# Patient Record
Sex: FEMALE | Race: WHITE | Employment: UNEMPLOYED
[De-identification: names, ages, dates, MRNs, and addresses within clinical notes are randomized per-mention and may not be internally consistent; named-entity substitution may affect disease eponyms.]

---

## 2024-03-28 ENCOUNTER — TRANSCRIBE ORDERS (OUTPATIENT)
Dept: OTHER | Age: 34
End: 2024-03-28

## 2024-03-28 ENCOUNTER — TELEPHONE (OUTPATIENT)
Dept: OTHER | Facility: CLINIC | Age: 34
End: 2024-03-28
Payer: MEDICAID

## 2024-03-28 DIAGNOSIS — I73.9 PERIPHERAL VASCULAR DISEASE (H): Primary | ICD-10-CM

## 2024-03-28 NOTE — TELEPHONE ENCOUNTER
"Referral received via Freeze Tag on 3/28/24.    Pt referred to [facility] by Lejeune, Stacey, MD  for PVD, thromboangiitis obliterans?, vasculitis?   \" Severe tibial and small-vessel disease in this young female. She does have significant smoking history and has undergone multiple medical workup. We will have her case further evaluated by vascular medicine specialists and we will need to consider DEJON, anticoagulation and or steroids. \"  Please see all notes and imaging in Care everywhere from [facility], [facility].     Routing to scheduling to coordinate the following:    NEW VASCULAR PATIENT consult with Dr. Gonzalez per referral.  Please schedule this at next available    Appt note:  Pt referred to [facility] by Lejeune, Stacey, MD  for PVD, thromboangiitis obliterans?,   vasculitis? \" Severe tibial and small-vessel disease in this young female. She does have significant smoking history and has undergone multiple medical workup. We will have her case further evaluated by vascular medicine specialists and we will need to consider DEJON, anticoagulation and or steroids.\" Please see all notes and imaging in Care everywhere from [facility], [facility].     Christa NUNEZ, RN    [facility]  [facility] Center  Office: [phone]  Fax: [phone]        "

## 2024-04-17 ENCOUNTER — OFFICE VISIT (OUTPATIENT)
Dept: OTHER | Facility: CLINIC | Age: 34
End: 2024-04-17
Attending: SURGERY
Payer: MEDICAID

## 2024-04-17 VITALS
BODY MASS INDEX: 33.32 KG/M2 | OXYGEN SATURATION: 100 % | SYSTOLIC BLOOD PRESSURE: 130 MMHG | HEART RATE: 82 BPM | HEIGHT: 64 IN | DIASTOLIC BLOOD PRESSURE: 86 MMHG | WEIGHT: 195.2 LBS

## 2024-04-17 DIAGNOSIS — L97.501 SKIN ULCER OF TOE, LIMITED TO BREAKDOWN OF SKIN, UNSPECIFIED LATERALITY (H): Primary | ICD-10-CM

## 2024-04-17 DIAGNOSIS — I73.9 PERIPHERAL VASCULAR DISEASE (H): ICD-10-CM

## 2024-04-17 PROCEDURE — 99205 OFFICE O/P NEW HI 60 MIN: CPT | Performed by: INTERNAL MEDICINE

## 2024-04-17 PROCEDURE — G0463 HOSPITAL OUTPT CLINIC VISIT: HCPCS | Performed by: INTERNAL MEDICINE

## 2024-04-17 PROCEDURE — 99417 PROLNG OP E/M EACH 15 MIN: CPT | Performed by: INTERNAL MEDICINE

## 2024-04-17 PROCEDURE — G2211 COMPLEX E/M VISIT ADD ON: HCPCS | Performed by: INTERNAL MEDICINE

## 2024-04-17 RX ORDER — ROPINIROLE 0.25 MG/1
0.25 TABLET, FILM COATED ORAL 3 TIMES DAILY
COMMUNITY

## 2024-04-17 RX ORDER — GABAPENTIN 300 MG/1
1 CAPSULE ORAL
COMMUNITY
Start: 2023-10-30 | End: 2024-04-27

## 2024-04-17 RX ORDER — PANTOPRAZOLE SODIUM 20 MG/1
20 TABLET, DELAYED RELEASE ORAL DAILY
COMMUNITY

## 2024-04-17 RX ORDER — ISOSORBIDE MONONITRATE 30 MG/1
30 TABLET, EXTENDED RELEASE ORAL DAILY
Qty: 30 TABLET | Refills: 11 | Status: SHIPPED | OUTPATIENT
Start: 2024-04-17

## 2024-04-17 RX ORDER — CYCLOBENZAPRINE HCL 10 MG
10 TABLET ORAL 3 TIMES DAILY PRN
COMMUNITY

## 2024-04-17 RX ORDER — NAPROXEN 250 MG/1
250 TABLET ORAL 2 TIMES DAILY WITH MEALS
COMMUNITY

## 2024-04-17 RX ORDER — COPPER 313.4 MG/1
1 INTRAUTERINE DEVICE INTRAUTERINE
COMMUNITY
Start: 2018-12-10 | End: 2028-12-07

## 2024-04-17 RX ORDER — VITAMIN B COMPLEX
1 TABLET ORAL DAILY
COMMUNITY

## 2024-04-17 RX ORDER — CLOPIDOGREL BISULFATE 75 MG/1
75 TABLET ORAL DAILY
Qty: 30 TABLET | Refills: 11 | Status: SHIPPED | OUTPATIENT
Start: 2024-04-17

## 2024-04-17 RX ORDER — ASPIRIN 81 MG/1
TABLET, CHEWABLE ORAL
COMMUNITY
Start: 2024-02-08

## 2024-04-17 NOTE — PROGRESS NOTES
"Patient is here to discuss consult    /84 (BP Location: Right arm, Patient Position: Chair, Cuff Size: Adult Large)   Pulse 80   Ht 5' 4\" (1.626 m)   Wt 195 lb 3.2 oz (88.5 kg)   SpO2 100%   BMI 33.51 kg/m      Questions patient would like addressed today are: N/A.    Refills are needed: No    Has homecare services and agency name:  [facility]    "

## 2024-04-17 NOTE — PROGRESS NOTES
INITIAL VASCULAR MEDICAL ASSESSMENT  REFERRAL SOURCE: Dr. 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 (H24)    Comment: This is a small vessel occlusive arterial disease. I doubt an autoimmune basis given persistently normal inflammatory markers and a normal JULIANN, ANCA, and RF. This is not atheroembolic given aortic findings. She should have a DEJON to rule out cardioembolic phenomena. This should be with a bubble study if one was not previously done. 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 It was reiterated to her that absolute tobacco cessation is a mandate. I would check labs as referenced below. I would treat her polypharmaceutically with varying types of vasodilators, AP therapy, and even therapeutic AC if she does not improve further. From a vasodilatory perspective, I would add a long acting nitrate to start. I will see her in f/u in one month. We could add NitroBID ointment at that time if not improving. We could also in the future withdraw the LA nitrate and treat her with Viagra. I would feel comfortable using Viagra with topical NitroBID but not systemic Imdur. I would also add P2Y12 inhibition to ASA. If when seen in f/u she is not improving further we could even add therapeutic AC. I have treated multiple similar patients in the past with a regimen of this sort and achieved total wound healing.   Plan: clopidogrel (PLAVIX) 75 MG tablet, isosorbide         mononitrate (IMDUR) 30 MG 24 hr tablet        Check YUSRA with TBIs and TCO2 in [place], [place] if not already done.       The longitudinal care of plan for Soledad was addressed during this visit. Due to added complexity of care, we will continue to support Soledad and the subsequent management of this condition and with ongoing continuity of care for this condition.       103 minutes total care on  today's date due to extensive record review from OSH.       HPI: Soledad Rush is a 33 year old female with a h/o rheumatic fever on monthly PCN injections at present.  She also has recent nonhealing wounds on her right great toe which are now slowly improving. She had been smoking cigarettes regularly for many years, but has now thankfully stopped with modest improvement in the wound. She was admitted to the [facility] in [place], [place] this past February and had a series of labs and imaging undertaken. Multiple HC markers (Leiden, Protein C, Protein S, APLAs) were all normal. Multiple autoimmune markers (JULIANN, RF, complement levels) were normal. CRP and ESR were mostly normal, with a few modestly abnormal results intermittently noted.  A 72 hour Holter monitor revealed only sinus rhythm. No AF or other ectopy was seen. TTE was normal w/o cardioembolic focus noted. CTA C/A/P w/ runoff revealed no atheroembolic foci. There were no PAUs or mobile arch ahteromae. She then proceeded to have a catheter directed BLE angio undertaken. This revealed severe tibial and small vessel disease bilaterally, right worse than left. There was no inline flow to the right foot, with severe occlusive and small vessel disease noted. In the LLE the anterior tibial artery was the primary runoff, proximal peroneal artery had flow but with distal occlusion. The left dorsalis pedis abruptly occluded with small collaterals filling the foot. She has used local wound care , tobacco cessation, and calcium channel blockers. The wound is still present. She does not claudicate, nor does she have rest pain.    There has been uncertainty as to the definitve diagnosis , with concern for vasculitis being present despite robust inflammatory index elevations or JULIANN / RF positivity. She had no corkscrew collaterals pathognomonic for thromboangiitis obliterans.     Review Of Systems  Skin: as above  Eyes: negative  Ears/Nose/Throat:  negative  Respiratory: No shortness of breath, dyspnea on exertion, cough, or hemoptysis  Cardiovascular: as above  Gastrointestinal: negative  Genitourinary: negative  Musculoskeletal: as above  Neurologic: negative  Psychiatric: negative  Hematologic/Lymphatic/Immunologic: negative  Endocrine: negative      PAST MEDICAL HISTORY:                No past medical history on file.    PAST SURGICAL HISTORY:                No past surgical history on file.    CURRENT MEDICATIONS:                  No current outpatient medications on file.       ALLERGIES:                Not on File    SOCIAL HISTORY:                  Social History     Socioeconomic History    Marital status:      Spouse name: Not on file    Number of children: Not on file    Years of education: Not on file    Highest education level: Not on file   Occupational History    Not on file   Tobacco Use    Smoking status: Not on file    Smokeless tobacco: Not on file   Substance and Sexual Activity    Alcohol use: Not on file    Drug use: Not on file    Sexual activity: Not on file   Other Topics Concern    Not on file   Social History Narrative    Not on file     Social Determinants of Health     Financial Resource Strain: Not on file   Food Insecurity: Not on file   Transportation Needs: Not on file   Physical Activity: Not on file   Stress: No Stress Concern Present (10/30/2023)    Received from [facility] - Occupational Stress Questionnaire     Feeling of Stress : Not at all   Social Connections: Not on file   Interpersonal Safety: Not on file   Housing Stability: Not on file       FAMILY HISTORY:                 No family history on file.      Physical exam Reveals:    O/P: WNL  HEENT: WNL  NECK: No JVD, thyromegaly, or lymphadenopathy  HEART: RRR, no murmurs, gallops, or rubs  LUNGS: CTA bilaterally without rales, wheezes, or rhonchi  GI: NABS, nondistended, nontender, soft  EXT:without cyanosis, clubbing,  or edema; right great toe with healing ulceration  NEURO: nonfocal  : no flank tenderness      Rt femoral: 3 plus palpable  Rt popliteal: 3 plus palpable  Rt DP:  nondopplerable   Rt PT:   nondopplerable       Lt femoral: 3 plus palpable   Lt popliteal: 3 plus palpable    Lt DP:  nondopplerable   Lt PT:   triphasic dopplerable       LABS:    4/16/2024 ESR: 10   (WNL)  4/16/2024 CRP: 0.49   (WNL)      4/12/2024 ESR: 16   (WNL)  4/12/2024 CRP: 0.60   (WNL)      4/09/2024 ESR: 12   (WNL)  4/09/2024 CRP: 0.43   (WNL)      4/05/2024 ESR: 7   (WNL)  4/05/2024 CRP: 0.52   (WNL)      4/02/2024 ESR: 11   (WNL)  4/16/2024 CRP: <0.30  (WNL)      03/29/2024 ESR: 13   (WNL)  03/29/2024 CRP: 0.48  (WNL)      03/26/2024 ESR: 12   (WNL)  03/26/2024 CRP: <0.30  (WNL)      03/22/2024 ESR: 18   (WNL)  03/22/2024 CRP: <0.30  (WNL)  03/26/2024 Coxiella: Neg  (WNL)      03/19/2024 Hep C: NR  (WNL)  03/19/2024 Hep B S Ab: POS (vaccinated)  03/19/2024 Hep B S Ag: NR  (WNL)  03/19/2024 Hep B Core Ab: NR (WNL)  03/19/2024 HLA-B27: Neg  (WNL)  03/19/2024 IgG 1-4: Normal  (WNL)  03/19/2024 Leiden mutation: Neg (WNL)  03/19/2024 Cryoglobulin: Neg (WNL)  03/19/2024 C3: Neg   (WNL)  03/19/2024 C4: Neg   (WNL)  03/19/2024 Protein S: Normal (WNL)  03/19/2024 Protein C: Normal (WNL)      03/18/2024 ESR: 18   (WNL)  03/18/2024 CRP: 0.36  (WNL)      03/15/2024 ESR: 16   (WNL)  03/15/2024 CRP: 0.64  (WNL)      03/11/2024 CRP: 2.25   (ABNL)      03/08/2024 ESR: 24   (ABNL)  03/08/2024 CRP: 1.65  (ABNL)      03/05/2024 CRP: 0.64  (WNL)      03/01/2024 ESR: 0.51  (WNL)    02/28/2024 LDL-C:   123  (ABNL, goal < 55)  02/28/2024 LDL-P: 1265  (ABNL)  02/28/2024 CRP: 0.64  (WNL)  02/28/2024 JULIANN: <80   (WNL)  02/28/2024 RF: <10   (WNL)    02/27/2024 CRP: 1.07  (ABNL)    02/23/2024 CRP: 0.41  (WNL)  02/23/2024 Coxiella: Neg  (WNL)  02/23/2024 Coxiella IgM: 1:16 (ABNL)    02/20/2024 CRP: 0.50  (WNL)  02/20/2024 ESR: 12   (WNL)    02/08/2024 ASO:  Positive  (ABNL)  2024 CRP: 1.39  (ABNL)  2024 ESR: 28   (ABNL)    2024 ANCA: < 1:20  (WNL)   2024 ACLA:  < 2.0  (WNL)   2024 B2GP:  < 1.1  (WNL)   2024 LAC: Undetected  (WNL)   2024 EBV: Undetected  (WNL)  2024 CCP: 6   (WNL)      2024 Coxiella: Neg  (WNL)  2024 Coxiella IgM: 1:16 (ABNL)    2024 Ace: 34   (WNL)  2024 Bartonella Ab IgG: <1:64 (WNL)  2024 HIV: Nonreactive  (WNL)  2024 Syphilis: Nonreactive (WNL)  2024 Monospot: Nonreactive (WNL)  2024 RF: <10   (WNL)  2024 CK: 44   (WNL)  2024 JULIANN: <80   (WNL)  2024 LDH: 146   (WNL)  2024 Brucella Ab: <1:20 (WNL)  2024 CMV Ab: Negative (WNL)      IMAGIN/15/2024 EKG:      Normal sinus rhythm   Normal ECG     2024 catheter directed BLE angiogram:    FINDINGS  Severe tibial and small vessel disease bilaterally, right worse than left  Right - no inline flow to foot, severe occlusive and small vessel disease  Left - anterior tibial artery as primary runoff, proximal peroneal artery flow with distal occlusion. Dorsalis pedis abruptly occludes with small collaterals filling the foot.        2024 TTE:       No sign of acute valvular disease. No sign of mitral stenosis and only trace insufficiency. Normal ejection fraction.          2024 CTA C/A/P w/ runoff    IMPRESSION:   1. Thoracic CTA negative for aneurysm or atherosclerotic irregularity. No stenosis of the great vessels.   2. CTA negative for mesenteric or renal arterial stenosis. No vessel irregularity or vascular wall thickening seen. CTA negative for iliac, femoral, or popliteal stenosis. No vessel irregularity or arterial wall thickening.   3. The trifurcation vessels are markedly small caliber, with predominantly anterior tibial runoff to the feet bilaterally. There is either occlusion or congenital absence of the posterior tibial arteries bilaterally. The peroneal  arteries are very small caliber, and the left peroneal artery appears to terminate in the mid calf.           01/23/2024 BLE arterial duplex:      IMPRESSION:   1. Right YUSRA 0.37.   2.  Left YUSRA unable to be calculated.   3.  Lower extremity arterial duplex with velocities as above. The right dorsalis pedis artery not seen and the left peroneal and dorsalis pedis arteries not seen.   4.  Consider CTA runoff or formal angiogram if indicated.         01/11/2024 72 hour Holter monitor:    CONCLUSIONS:   Sinus rhythm with an average heart rate of 88 beats per minute with no significant ectopy or arrhythmias. Normal Holter monitor.                   Elvin Blanco M.D. - 01/11/2024  4:41 PM CST   Formatting of this note might be different from the original.   ACCESSION ORDER NUMBER:  [id number]   EXAM:  [id number] : EXTENDED HOLTER 48HRS  7 DAYS    EXAM DATE:  01/04/2024   ORDERING PHYSICIAN:  Nallely Brown     PROCEDURE:   Holter monitor.     INDICATION FOR MONITORING:   Syncope and collapse.     DATES OF THE MONITOR:   January 4 to January 6, 2024   Prescribed time: 3 days   Diagnostic time: 2 days 0 hours, 14 minutes   Artifact time: 0 hours and 34 minutes     FINDINGS:   Predominant rhythm was sinus with a minimum heart rate of 48 beats per minute, average heart rate 88 beats per minute, maximum heart rate 158 beats per minute.  Tachycardia was 15% of total.  Bradycardia was less than 1% of total.  There was no significant arrhythmias and no significant ectopy. There were 0 patient triggers.     CONCLUSIONS:   Sinus rhythm with an average heart rate of 88 beats per minute with no significant ectopy or arrhythmias. Normal Holter monitor.       Elvin Blanco MD/Cyndi Silveira II, M.D. - 01/23/2024  Formatting of this note might be different from the original.  EXAM: VAS LOWER ART DUPLEX YUSRA MAYCOL       ACCESSION #: [id number]  EXAM DATE: 01/23/2024 13:01    [facility]  ORDERING  PHYSICIAN: ANDIE BONILLA     MRN #: [id number]  PATIENT NAME: GLEN MCCLELLAND    PROCEDURE: VAS LOWER ART DUPLEX YUSRA MAYCOL    TECHNIQUE: Bilateral lower extremity ABIs and bilateral arterial duplex of the lower extremity including grayscale color, spectral and Doppler    DATE:  1/23/2024 13:01 CST    COMPARISON: None.    INDICATION: RAYNAUD DISEASE    FINDINGS:  Unable to obtain recordings in the right toe, right dorsalis pedis, left dorsalis pedis and left posterior tibial arteries.  Right brachial pressure 126 mmHg.  Left brachial pressure 120 mmHg.  Right posterior tibial pressure with a blunted waveform and pressure of 47 mmHg.  Right YUSRA 0.37.  Left digital pressure 69 with a left toe-brachial index of 0.54.    Right lower extremity duplex evaluation with velocities in meters per second:  Common femoral artery 1.6.  Profunda 0.8.  Superficial femoral artery proximal, mid and distal at 0.93, 0.85 and 0.79.  Proximal and distal popliteal artery at 0.47 and 0.63.  Posterior tibial 0.5.  Peroneal 0.3.  Anterior tibial 0.4.  Dorsalis pedis not seen.

## 2024-05-19 ENCOUNTER — HEALTH MAINTENANCE LETTER (OUTPATIENT)
Age: 34
End: 2024-05-19

## 2024-05-28 ENCOUNTER — TELEPHONE (OUTPATIENT)
Dept: OTHER | Facility: CLINIC | Age: 34
End: 2024-05-28
Payer: MEDICAID

## 2024-05-29 NOTE — TELEPHONE ENCOUNTER
Called patient to schedule. She is still trying to schedule a DEJON and will call [facility] when she has that appt scheduled.    Flagging to follow up 6/12/24

## 2024-06-04 NOTE — TELEPHONE ENCOUNTER
Patient is still trying to get her DEJON scheduled at [place]. Pt stated that she will in fact call us to schedule her Virtual follow up with Dr Gonzalez when she gets her DEJON scheduled. Advised patient that we will not call her again and just wait to hear back from her.

## 2024-06-28 NOTE — TELEPHONE ENCOUNTER
Patient had her DEJON done 06/26/24. Pt is just starting her isosorbide mononitrate (IMDUR) 30 MG 24 hr tablet today (06/28/24) and her Virtual Visit is scheduled for 07/29/24 with Dr Gonzalez.

## 2024-07-01 ENCOUNTER — TELEPHONE (OUTPATIENT)
Dept: OTHER | Facility: CLINIC | Age: 34
End: 2024-07-01
Payer: MEDICAID

## 2024-07-01 NOTE — TELEPHONE ENCOUNTER
[facility]    Who is the name of the provider?:  ASHLEY RICH   What is the location you see this provider at/preferred location?: [place]  Person calling / Facility: Soledad Rush  Phone number:  [phone] (home)   Nurse call back needed:  YES     Reason for call:  Patient describes becoming ill after using    isosorbide mononitrate (IMDUR) 30 MG 24 hr tablet [[id number]]     Asked to speak to a nurse about symptoms and alternatives.    Pharmacy location:  [facility] [address] [place] [address] Imaging: n/a   Can we leave a detailed message on this number?  YES     7/1/2024, 9:38 AM

## 2024-07-01 NOTE — TELEPHONE ENCOUNTER
[employer] message sent to patient.    Christa NUNEZ, RN    [facility]  Office: [phone]  Fax: [phone]

## 2024-07-01 NOTE — TELEPHONE ENCOUNTER
Per chart review patient started Imdur on 6/28/24.  Returned call to Soledad and she states since all weekend she has been experiencing a severe headache with vomiting. I explained because Imdur is in a class of drugs known as nitrates and they work by relaxing and widening blood vessels so blood can flow more easily. This can cause headaches due to the increased blood flow to brain but typically resolves in a few weeks. She verbalized understanding and would like to know what the alternative would be if she was to stop taking it. I discussed I will route a message to Dr. Gonzalez and get back to her.    Christa NUNEZ, RN    [facility]  Vascular [facility] Center  Office: [phone]  Fax: [phone]

## 2024-07-01 NOTE — TELEPHONE ENCOUNTER
This is being Rxd not as an antihypertensive but as a vasodilator to preserve blood flow. I would advise she try to tough it out as the headaches usually diminish as time goes on over the course of a few weeks. If she finds that she simply can not tolerate it please have her set uop for a virtual visit to discuss in greater detail

## 2024-07-03 NOTE — TELEPHONE ENCOUNTER
Zonia message not read- called patient and LVM in regards to Dr. Nogueira message below.     Christa NUNEZ, RN    [facility]  Vascular [facility]  Office: [phone]  Fax: [phone]

## 2024-07-29 ENCOUNTER — VIRTUAL VISIT (OUTPATIENT)
Dept: OTHER | Facility: CLINIC | Age: 34
End: 2024-07-29
Attending: INTERNAL MEDICINE
Payer: MEDICAID

## 2024-07-29 DIAGNOSIS — I73.9 PERIPHERAL VASCULAR DISEASE (H): ICD-10-CM

## 2024-07-29 DIAGNOSIS — L97.501 SKIN ULCER OF TOE, LIMITED TO BREAKDOWN OF SKIN, UNSPECIFIED LATERALITY (H): ICD-10-CM

## 2024-07-29 PROCEDURE — 99443 PR PHYSICIAN TELEPHONE EVALUATION 21-30 MIN: CPT | Mod: 93 | Performed by: INTERNAL MEDICINE

## 2024-07-29 RX ORDER — CLONAZEPAM 0.5 MG/1
TABLET ORAL
COMMUNITY
Start: 2024-07-19

## 2024-07-29 RX ORDER — KETOCONAZOLE 20 MG/ML
SHAMPOO TOPICAL
COMMUNITY

## 2024-07-29 RX ORDER — CLOBETASOL PROPIONATE 0.5 MG/G
OINTMENT TOPICAL
COMMUNITY

## 2024-07-29 NOTE — PROGRESS NOTES
VASCULAR MEDICAL FOLLOW UP ASSESSMENT  REFERRAL SOURCE: Dr. 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 (H24)     Comment:     -This is a small vessel occlusive arterial disease.   -I doubt an autoimmune basis given persistently normal inflammatory markers and a normal JULIANN, ANCA, and RF.   -This is not atheroembolic given aortic findings.   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 It was reiterated to her that absolute tobacco cessation is a mandate. She has now stopped smoking.   -From a vasodilatory perspective, I added a long acting nitrate on 4/17. From an antiplatelet perspective, we started her on Plavix. On these two meds, she is doing much better. She notes that scabs have healed. She feels her toes are 80% improved.   -She did not get ABIs with TCO2 or a DEJON done that I can see in Care Everywhere.   -I would treat her polypharmaceutically with varying types of vasodilators, AP therapy, and even therapeutic AC if she does not improve further.   -I have treated multiple similar patients in the past with a regimen of this sort and achieved total wound healing.     Plan: Continue clopidogrel (PLAVIX) 75 MG tablet, increase isosorbide         mononitrate (IMDUR) 30 MG 24 hr tablet; She is having menorrhagia on only Plavix so I am reluctant to add Xarelto off label. She has headaches on Imdur 30 so I am reluctant to increase the dose. She is 80%improved symptomatically, so I would like to see her back in three months. If wounds entirely healed and toes are normal at that time, we will scale back off of Imdur and Plavix. As her sxs are improving, I will not get  ABIs with TCO2 or a DEJON.       21 minutes total medical care on today's date.    MD location: office  Pt location: Driving her car in [place].    Phone call start: 16:28  Phone call end: 16:49           HPI: Soledad Rush is a 34 year old female with a h/o rheumatic fever on monthly PCN injections at present.  She also has recent nonhealing wounds on her right great toe which are now slowly improving. She had been smoking cigarettes regularly for many years, but has now thankfully stopped with modest improvement in the wound. She was admitted to the [facility] in [place], [place] this past February and had a series of labs and imaging undertaken. Multiple HC markers (Leiden, Protein C, Protein S, APLAs) were all normal. Multiple autoimmune markers (JULIANN, RF, complement levels) were normal. CRP and ESR were mostly normal, with a few modestly abnormal results intermittently noted.  A 72 hour Holter monitor revealed only sinus rhythm. No AF or other ectopy was seen. TTE was normal w/o cardioembolic focus noted. CTA C/A/P w/ runoff revealed no atheroembolic foci. There were no PAUs or mobile arch ahteromae. She then proceeded to have a catheter directed BLE angio undertaken. This revealed severe tibial and small vessel disease bilaterally, right worse than left. There was no inline flow to the right foot, with severe occlusive and small vessel disease noted. In the LLE the anterior tibial artery was the primary runoff, proximal peroneal artery had flow but with distal occlusion. The left dorsalis pedis abruptly occluded with small collaterals filling the foot. She has used local wound care , tobacco cessation, and calcium channel blockers. The wound is still present. She does not claudicate, nor does she have rest pain.     There has been uncertainty as to the definitve diagnosis , with concern for vasculitis being present despite robust inflammatory index elevations or JULIANN / RF positivity. She had no corkscrew collaterals pathognomonic for thromboangiitis obliterans.      Review Of Systems  Skin: as above  Eyes: negative  Ears/Nose/Throat: negative  Respiratory: No shortness of breath, dyspnea on exertion, cough,  or hemoptysis  Cardiovascular: as above  Gastrointestinal: negative  Genitourinary: negative  Musculoskeletal: as above  Neurologic: negative  Psychiatric: negative  Hematologic/Lymphatic/Immunologic: negative  Endocrine: negative        PAST MEDICAL HISTORY:                  Past Medical History   No past medical history on file.        PAST SURGICAL HISTORY:                  Past Surgical History   No past surgical history on file.        CURRENT MEDICATIONS:                  Current Outpatient Prescriptions   No current outpatient medications on file.            ALLERGIES:                  Allergies   Not on File        SOCIAL HISTORY:                  Social History   Social History            Socioeconomic History    Marital status:        Spouse name: Not on file    Number of children: Not on file    Years of education: Not on file    Highest education level: Not on file   Occupational History    Not on file   Tobacco Use    Smoking status: Not on file    Smokeless tobacco: Not on file   Substance and Sexual Activity    Alcohol use: Not on file    Drug use: Not on file    Sexual activity: Not on file   Other Topics Concern    Not on file   Social History Narrative    Not on file      Social Determinants of Health           Financial Resource Strain: Not on file   Food Insecurity: Not on file   Transportation Needs: Not on file   Physical Activity: Not on file   Stress: No Stress Concern Present (10/30/2023)     Received from [facility] - Occupational Stress Questionnaire      Feeling of Stress : Not at all   Social Connections: Not on file   Interpersonal Safety: Not on file   Housing Stability: Not on file            FAMILY HISTORY:                   Family History   No family history on file.           Physical exam Reveals:     O/P: WNL  HEENT: WNL  NECK: No JVD, thyromegaly, or lymphadenopathy  HEART: RRR, no murmurs, gallops, or rubs  LUNGS: CTA bilaterally  without rales, wheezes, or rhonchi  GI: NABS, nondistended, nontender, soft  EXT:without cyanosis, clubbing, or edema; right great toe with healing ulceration  NEURO: nonfocal  : no flank tenderness        Rt femoral:      3 plus palpable  Rt popliteal:     3 plus palpable  Rt DP:              nondopplerable   Rt PT:              nondopplerable         Lt femoral:       3 plus palpable   Lt popliteal:      3 plus palpable    Lt DP:              nondopplerable   Lt PT:               triphasic dopplerable         LABS:     4/16/2024 ESR: 10                             (WNL)  4/16/2024 CRP: 0.49                          (WNL)        4/12/2024 ESR: 16                             (WNL)  4/12/2024 CRP: 0.60                          (WNL)        4/09/2024 ESR: 12                             (WNL)  4/09/2024 CRP: 0.43                          (WNL)        4/05/2024 ESR: 7                               (WNL)  4/05/2024 CRP: 0.52                          (WNL)        4/02/2024 ESR: 11                             (WNL)  4/16/2024 CRP: <0.30                        (WNL)        03/29/2024 ESR: 13                           (WNL)  03/29/2024 CRP: 0.48                        (WNL)        03/26/2024 ESR: 12                           (WNL)  03/26/2024 CRP: <0.30                      (WNL)        03/22/2024 ESR: 18                           (WNL)  03/22/2024 CRP: <0.30                      (WNL)  03/26/2024 Coxiella: Neg                   (WNL)        03/19/2024 Hep C: NR                       (WNL)  03/19/2024 Hep B S Ab: POS(vaccinated)  03/19/2024 Hep B S Ag: NR               (WNL)  03/19/2024 Hep B Core Ab: NR(WNL)  03/19/2024 HLA-B27: Neg                 (WNL)  03/19/2024 IgG 1-4: Normal               (WNL)  03/19/2024 Leiden mutation: Neg(WNL)  03/19/2024 Cryoglobulin: Neg(WNL)  03/19/2024 C3: Neg                            (WNL)  03/19/2024 C4: Neg                            (WNL)  03/19/2024 Protein S:  Normal(WNL)  03/19/2024 Protein C: Normal(WNL)        03/18/2024 ESR: 18                           (WNL)  03/18/2024 CRP: 0.36                        (WNL)        03/15/2024 ESR: 16                           (WNL)  03/15/2024 CRP: 0.64                        (WNL)        03/11/2024 CRP: 2.25                        (ABNL)        03/08/2024 ESR: 24                           (ABNL)  03/08/2024 CRP: 1.65                        (ABNL)        03/05/2024 CRP: 0.64                        (WNL)        03/01/2024 ESR: 0.51                        (WNL)     02/28/2024 LDL-C:   123                    (ABNL, goal < 55)  02/28/2024 LDL-P: 1265                    (ABNL)  02/28/2024 CRP: 0.64                        (WNL)  02/28/2024 JULIANN: <80                         (WNL)  02/28/2024 RF: <10                            (WNL)     02/27/2024 CRP: 1.07                        (ABNL)     02/23/2024 CRP: 0.41                        (WNL)  02/23/2024 Coxiella: Neg                   (WNL)  02/23/2024 Coxiella IgM: 1:16(ABNL)     02/20/2024 CRP: 0.50                        (WNL)  02/20/2024 ESR: 12                           (WNL)     02/08/2024 ASO: Positive                 (ABNL)  02/08/2024 CRP: 1.39                        (ABNL)  02/08/2024 ESR: 28                           (ABNL)     02/07/2024 ANCA: < 1:20                  (WNL)    02/07/2024 ACLA:  < 2.0                    (WNL)    02/07/2024 B2GP:  < 1.1                    (WNL)    02/07/2024 LAC: Undetected             (WNL)    02/07/2024 EBV: Undetected             (WNL)  02/07/2024 CCP: 6                             (WNL)        01/19/2024 Coxiella: Neg                   (WNL)  01/19/2024 Coxiella IgM: 1:16(ABNL)     01/12/2024 Ace: 34                             (WNL)  01/12/2024 Bartonella Ab IgG: <1:64 (WNL)  01/12/2024 HIV: Nonreactive             (WNL)  01/12/2024 Syphilis: Nonreactive       (WNL)  01/12/2024 Monospot: Nonreactive   (WNL)  01/12/2024 RF: <10                             (WNL)  2024 CK: 44                              (WNL)  2024 JULIANN: <80                         (WNL)  2024 LDH: 146                          (WNL)  2024 Brucella Ab: <1:20           (WNL)  2024 CMV Ab: Negative           (WNL)        IMAGIN/15/2024 EKG:        Normal sinus rhythm   Normal ECG      2024 catheter directed BLE angiogram:     FINDINGS  Severe tibial and small vessel disease bilaterally, right worse than left  Right - no inline flow to foot, severe occlusive and small vessel disease  Left - anterior tibial artery as primary runoff, proximal peroneal artery flow with distal occlusion. Dorsalis pedis abruptly occludes with small collaterals filling the foot.           2024 TTE:                                      No sign of acute valvular disease. No sign of mitral stenosis and only trace insufficiency. Normal ejection fraction.              2024 CTA C/A/P w/ runoff     IMPRESSION:   1. Thoracic CTA negative for aneurysm or atherosclerotic irregularity. No stenosis of the great vessels.   2. CTA negative for mesenteric or renal arterial stenosis. No vessel irregularity or vascular wall thickening seen. CTA negative for iliac, femoral, or popliteal stenosis. No vessel irregularity or arterial wall thickening.   3. The trifurcation vessels are markedly small caliber, with predominantly anterior tibial runoff to the feet bilaterally. There is either occlusion or congenital absence of the posterior tibial arteries bilaterally. The peroneal arteries are very small caliber, and the left peroneal artery appears to terminate in the mid calf.               2024 BLE arterial duplex:        IMPRESSION:   1. Right YUSRA 0.37.   2.  Left YUSRA unable to be calculated.   3.  Lower extremity arterial duplex with velocities as above. The right dorsalis pedis artery not seen and the left peroneal and dorsalis pedis arteries not seen.   4.  Consider CTA runoff  or formal angiogram if indicated.            01/11/2024 72 hour Holter monitor:     CONCLUSIONS:   Sinus rhythm with an average heart rate of 88 beats per minute with no significant ectopy or arrhythmias. Normal Holter monitor.                           Elvin Blanco M.D. - 01/11/2024  4:41 PM CST   Formatting of this note might be different from the original.   ACCESSION ORDER NUMBER:  [id number]   EXAM:  [id number] : EXTENDED HOLTER 48HRS  7 DAYS    EXAM DATE:  01/04/2024   ORDERING PHYSICIAN:  Nallely Brown     PROCEDURE:   Holter monitor.     INDICATION FOR MONITORING:   Syncope and collapse.     DATES OF THE MONITOR:   January 4 to January 6, 2024   Prescribed time: 3 days   Diagnostic time: 2 days 0 hours, 14 minutes   Artifact time: 0 hours and 34 minutes     FINDINGS:   Predominant rhythm was sinus with a minimum heart rate of 48 beats per minute, average heart rate 88 beats per minute, maximum heart rate 158 beats per minute.  Tachycardia was 15% of total.  Bradycardia was less than 1% of total.  There was no significant arrhythmias and no significant ectopy. There were 0 patient triggers.     CONCLUSIONS:   Sinus rhythm with an average heart rate of 88 beats per minute with no significant ectopy or arrhythmias. Normal Holter monitor.       Elvin Blanco MD/pm                     Cyndi Stroud II, M.D. - 01/23/2024  Formatting of this note might be different from the original.  EXAM: VAS LOWER ART DUPLEX YUSRA MAYCOL       ACCESSION #: [id number]  EXAM DATE: 01/23/2024 13:01    ORDER LOCATION: [facility]  ORDERING PHYSICIAN: ANDIE BONILLA     MRN #: [id number]  PATIENT NAME: GLEN MCCLELLAND    PROCEDURE: VAS LOWER ART DUPLEX YUSRA MAYCOL    TECHNIQUE: Bilateral lower extremity ABIs and bilateral arterial duplex of the lower extremity including grayscale color, spectral and Doppler    DATE:  1/23/2024 13:01 CST    COMPARISON: None.    INDICATION: RAYNAUD DISEASE    FINDINGS:  Unable to obtain recordings in the right  toe, right dorsalis pedis, left dorsalis pedis and left posterior tibial arteries.  Right brachial pressure 126 mmHg.  Left brachial pressure 120 mmHg.  Right posterior tibial pressure with a blunted waveform and pressure of 47 mmHg.  Right YUSRA 0.37.  Left digital pressure 69 with a left toe-brachial index of 0.54.    Right lower extremity duplex evaluation with velocities in meters per second:  Common femoral artery 1.6.  Profunda 0.8.  Superficial femoral artery proximal, mid and distal at 0.93, 0.85 and 0.79.  Proximal and distal popliteal artery at 0.47 and 0.63.  Posterior tibial 0.5.  Peroneal 0.3.  Anterior tibial 0.4.  Dorsalis pedis not seen.

## 2024-07-29 NOTE — PROGRESS NOTES
Soledad is a 34 year old who is being evaluated via a billable telephone visit.    What phone number would you like to be contacted at? [phone] Telephone Visit - Follow up to 04/17/2024 - Virtual visit *LMB 06/28/24   How would you like to obtain your AVS? Harvinder Hoang MA

## 2024-09-19 ENCOUNTER — TELEPHONE (OUTPATIENT)
Dept: OTHER | Facility: CLINIC | Age: 34
End: 2024-09-19
Payer: MEDICAID

## 2024-09-19 DIAGNOSIS — I73.9 PERIPHERAL VASCULAR DISEASE (H): Primary | ICD-10-CM

## 2024-09-19 NOTE — TELEPHONE ENCOUNTER
Routing to scheduling to coordinate the following:  Follow up in 1 month virtually with Dr. Gonzalez for Evars dx     Appt note:  follow up to 7/29/24 appt     Thank you  Shimon Morales RN on 9/19/2024 at 3:18 PM

## 2024-09-20 NOTE — TELEPHONE ENCOUNTER
Left voicemail for patient to call back to schedule appointment(s), provided telephone number for patient to call back to schedule.    Follow up in 1 month virtually with Dr. Gonzalez for Buergers dx      Appt note:  follow up to 7/29/24 appt

## 2024-09-24 NOTE — TELEPHONE ENCOUNTER
Left voicemail for patient to schedule appointment(s), stated this our 2nd attempt at scheduling and provided [facility] telephone number for patient to call back to schedule.    Follow up in 1 month virtually with Dr. Gonzalez for Buergers dx      Appt note:  follow up to 7/29/24 appt

## 2024-11-19 ENCOUNTER — VIRTUAL VISIT (OUTPATIENT)
Dept: OTHER | Facility: CLINIC | Age: 34
End: 2024-11-19
Attending: INTERNAL MEDICINE
Payer: COMMERCIAL

## 2024-11-19 DIAGNOSIS — I73.9 PERIPHERAL VASCULAR DISEASE (H): ICD-10-CM

## 2024-11-19 RX ORDER — ESCITALOPRAM OXALATE 20 MG/1
1 TABLET ORAL DAILY
COMMUNITY
Start: 2024-11-18 | End: 2025-05-17

## 2024-11-19 NOTE — PROGRESS NOTES
VASCULAR MEDICAL FOLLOW UP ASSESSMENT  REFERRAL SOURCE: Dr. 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 (H24)     Comment:      -This is a small vessel occlusive arterial disease.   -I doubt an autoimmune basis given persistently normal inflammatory markers and a normal JULIANN, ANCA, and RF.   -This is not atheroembolic given aortic findings.   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 It was reiterated to her that absolute tobacco cessation is a mandate. She has now stopped smoking.   -From a vasodilatory perspective, I added a long acting nitrate on 4/17. From an antiplatelet perspective, we started her on Plavix. On these two meds, she is doing much better. She notes that scabs have healed. She feels her toes are 80% improved.   -She did not get ABIs with TCO2 or a DEJON done that I can see in Care Everywhere.   -I would treat her polypharmaceutically with varying types of vasodilators, AP therapy, and even therapeutic AC if she does not improve further.   -I have treated multiple similar patients in the past with a regimen of this sort and achieved total wound healing.   -She was 80%improved symptomatically when seen on 07/28/2024. She currently states her wounds are entirely healed and her toes are normal at this time. We will therefore scale back off of Imdur and Plavix as below. As her sxs have improved, I will not get  ABIs with TCO2 or a DEJON.     Plan:   1-Discontinue clopidogrel (PLAVIX) 75 MG tablet now.  2-Remain on isosorbide mononitrate (IMDUR) 30 mg daily for one additional month, then switch to 30 mg PO every other day for one month, then stop that entirely.  3-Do not smoke at all.  4-Undertake thermal protection strategies for fingers and toes in winter months.  5-RTC in person prn recurrence or worsening in the future.             21 minutes  total medical care on today's date.     MD location: office  Pt location: Driving her car in [place].     Phone call start: 15:09  Phone call end: 15:30           HPI: Soledad Rush is a 34 year old female with a h/o rheumatic fever on monthly PCN injections at present.      She also has recent nonhealing wounds on her right great toe which are now slowly improving. She had been smoking cigarettes regularly for many years, but has now thankfully stopped with modest improvement in the wound. She was admitted to the [facility] in [place], [place] this past February and had a series of labs and imaging undertaken. Multiple HC markers (Leiden, Protein C, Protein S, APLAs) were all normal. Multiple autoimmune markers (JULIANN, RF, complement levels) were normal. CRP and ESR were mostly normal, with a few modestly abnormal results intermittently noted.  A 72 hour Holter monitor revealed only sinus rhythm. No AF or other ectopy was seen. TTE was normal w/o cardioembolic focus noted. CTA C/A/P w/ runoff revealed no atheroembolic foci. There were no PAUs or mobile arch ahteromae. She then proceeded to have a catheter directed BLE angio undertaken. This revealed severe tibial and small vessel disease bilaterally, right worse than left. There was no inline flow to the right foot, with severe occlusive and small vessel disease noted. In the LLE the anterior tibial artery was the primary runoff, proximal peroneal artery had flow but with distal occlusion. The left dorsalis pedis abruptly occluded with small collaterals filling the foot. She has used local wound care , tobacco cessation, and calcium channel blockers. The wound is still present. She does not claudicate, nor does she have rest pain.     There has been uncertainty as to the definitve diagnosis , with concern for vasculitis being present despite robust inflammatory index elevations or JULIANN / RF positivity. She had no corkscrew collaterals pathognomonic for thromboangiitis  obliterans.       Review Of Systems  Skin: negative  Eyes: negative  Ears/Nose/Throat: negative  Respiratory: No shortness of breath, dyspnea on exertion, cough, or hemoptysis  Cardiovascular: negative  Gastrointestinal: negative  Genitourinary: negative  Musculoskeletal: improved wounds  Neurologic: negative  Psychiatric: negative  Hematologic/Lymphatic/Immunologic: negative  Endocrine: negative      PAST MEDICAL HISTORY:                No past medical history on file.    PAST SURGICAL HISTORY:                No past surgical history on file.    CURRENT MEDICATIONS:                  Current Outpatient Medications   Medication Sig Dispense Refill    aspirin (ASA) 81 MG chewable tablet Use as directed in the mouth or throat.      clobetasol (TEMOVATE) 0.05 % external ointment APPLY OINTMENT TOPICALLY TWICE DAILY AS NEEDED TO FLARES OF RASH ON THE HANDS AND FEET      clonazePAM (KLONOPIN) 0.5 MG tablet TAKE 1/2 (ONE-HALF) TABLET BY MOUTH EVERY DAY AT BEDTIME FOR 14 DAYS THEN 1 TABLET EVERY DAY AT BEDTIME      clopidogrel (PLAVIX) 75 MG tablet Take 1 tablet (75 mg) by mouth daily 30 tablet 11    cyclobenzaprine (FLEXERIL) 10 MG tablet Take 10 mg by mouth 3 times daily as needed for muscle spasms (Patient not taking: Reported on 7/29/2024)      gabapentin (NEURONTIN) 300 MG capsule Take 1 capsule by mouth      isosorbide mononitrate (IMDUR) 30 MG 24 hr tablet Take 1 tablet (30 mg) by mouth daily 30 tablet 11    ketoconazole (NIZORAL) 2 % external shampoo WASH SCALP ONCE DAILY WHEN FLARED. LATHER AND LEAVE SIT FOR 3-5 MINUTES PRIOR TO RINSING. ONCE CLEAR USE 2-3 TIMES A WEEK FOR MAINTENANCE      naproxen (NAPROSYN) 250 MG tablet Take 250 mg by mouth 2 times daily (with meals) (Patient not taking: Reported on 7/29/2024)      pantoprazole (PROTONIX) 20 MG EC tablet Take 20 mg by mouth daily      paragard intrauterine copper device 1 Device by Intrauterine route      rOPINIRole (REQUIP) 0.25 MG tablet Take 0.25 mg by mouth  3 times daily (Patient not taking: Reported on 7/29/2024)      vitamin B-12 (CYANOCOBALAMIN) 100 MCG tablet Take 1 tablet by mouth daily      Vitamin D3 (CHOLECALCIFEROL) 25 mcg (1000 units) tablet Take 1 tablet by mouth daily         ALLERGIES:                  Allergies   Allergen Reactions    Hydrocodone GI Disturbance and Nausea and Vomiting    Milk (Cow) GI Disturbance and Nausea and Vomiting    Morphine GI Disturbance and Nausea and Vomiting       SOCIAL HISTORY:                  Social History     Socioeconomic History    Marital status:      Spouse name: Not on file    Number of children: Not on file    Years of education: Not on file    Highest education level: Not on file   Occupational History    Not on file   Tobacco Use    Smoking status: Former     Types: Cigarettes     Start date: 2/1/2024    Smokeless tobacco: Never    Tobacco comments:     Quit 02/01/2024   Substance and Sexual Activity    Alcohol use: Yes     Comment: 2x a week    Drug use: Never    Sexual activity: Not on file   Other Topics Concern    Not on file   Social History Narrative    Not on file     Social Drivers of Health     Financial Resource Strain: Not on file   Food Insecurity: Not on file   Transportation Needs: Not on file   Physical Activity: Not on file   Stress: No Stress Concern Present (10/30/2023)    Received from [facility] - Occupational Stress Questionnaire     Feeling of Stress : Not at all   Social Connections: Not on file   Interpersonal Safety: Not on file   Housing Stability: Not on file       FAMILY HISTORY:                 No family history on file.         Physical exam was not undertaken as this was a billable telephone encounter.            All relevant labs and imaging reviewed by myself on today's date.